# Patient Record
Sex: FEMALE | URBAN - METROPOLITAN AREA
[De-identification: names, ages, dates, MRNs, and addresses within clinical notes are randomized per-mention and may not be internally consistent; named-entity substitution may affect disease eponyms.]

---

## 2017-04-16 ENCOUNTER — TRANSFERRED RECORDS (OUTPATIENT)
Dept: HEALTH INFORMATION MANAGEMENT | Facility: CLINIC | Age: 32
End: 2017-04-16

## 2017-04-16 LAB
CREAT SERPL-MCNC: 0.95 MG/DL (ref 0.6–1.1)
GFR SERPL CREATININE-BSD FRML MDRD: >60 ML/MIN/1.73M2
GLUCOSE SERPL-MCNC: 86 MG/DL (ref 70–125)
POTASSIUM SERPL-SCNC: 3.9 MMOL/L (ref 3.5–5)

## 2017-08-28 DIAGNOSIS — I10 ESSENTIAL HYPERTENSION WITH GOAL BLOOD PRESSURE LESS THAN 140/90: ICD-10-CM

## 2017-08-28 NOTE — LETTER
[facility]  [address] [place] [address]    September 6, 2017    Caryn Delcid  [address]   [facility] [address]      Dear Caryn,    We have been unsuccessful reaching you by telephone. You are due for an office visit regarding your Blood Presser with me. You can schedule this one of a few ways. First using My Chart Scheduling, second is calling our Main number [phone] this line is open for scheduling 24 hours 7 days a week. Please let us know if you are getting care elsewhere.      Sincerely,        Aislinn Mohamud MD/dt

## 2017-08-29 RX ORDER — METHYLDOPA 500 MG/1
TABLET, FILM COATED ORAL
Qty: 60 TABLET | Refills: 0 | Status: SHIPPED | OUTPATIENT
Start: 2017-08-29 | End: 2017-10-27

## 2017-08-29 NOTE — TELEPHONE ENCOUNTER
methyldopa (ALDOMET) 500 MG tablet      Last Written Prescription Date:  8/2/2017  Last Fill Quantity: 60,   # refills: 0  Last [place] [facility] or [facility] prescribing provider: 7/12/2016  Future Office visit:       Routing refill request to provider for review/approval because:  Drug not on the [facility] or [facility] refill protocol or controlled substance

## 2017-10-27 ENCOUNTER — TELEPHONE (OUTPATIENT)
Dept: FAMILY MEDICINE | Facility: CLINIC | Age: 32
End: 2017-10-27

## 2017-10-27 DIAGNOSIS — I10 ESSENTIAL HYPERTENSION WITH GOAL BLOOD PRESSURE LESS THAN 140/90: ICD-10-CM

## 2017-10-31 RX ORDER — METHYLDOPA 500 MG/1
TABLET, FILM COATED ORAL
Qty: 60 TABLET | Refills: 0 | Status: SHIPPED | OUTPATIENT
Start: 2017-10-31 | End: 2017-11-29

## 2017-10-31 NOTE — TELEPHONE ENCOUNTER
Pending Prescriptions:                       Disp   Refills    methyldopa (ALDOMET) 500 MG tablet [Pharma*60 tab*0        Sig: TAKE 1 TABLET BY MOUTH TWICE DAILY    Medication not on FM protocol, routing to provider. Patient was advised after last refill request that a follow up appointment was needed for further refills, phone calls were attempted and a letter was sent.   Joselyn Cannon RN ............   10/31/2017...8:48 AM

## 2017-11-01 NOTE — TELEPHONE ENCOUNTER
Patient has appointment scheduled 11/7/2017 with provider.  Maite GONZALEZ CMA ([facility])

## 2017-11-29 ENCOUNTER — TELEPHONE (OUTPATIENT)
Dept: FAMILY MEDICINE | Facility: CLINIC | Age: 32
End: 2017-11-29

## 2017-11-29 DIAGNOSIS — I10 ESSENTIAL HYPERTENSION WITH GOAL BLOOD PRESSURE LESS THAN 140/90: ICD-10-CM

## 2017-12-01 RX ORDER — METHYLDOPA 500 MG/1
TABLET, FILM COATED ORAL
Qty: 30 TABLET | Refills: 0 | Status: SHIPPED | OUTPATIENT
Start: 2017-12-01

## 2017-12-07 NOTE — TELEPHONE ENCOUNTER
Called and left message to call clinic to make an appointment so we can refill her medication.  Imani Ruffin CMA ([facility])

## 2017-12-19 NOTE — TELEPHONE ENCOUNTER
Called pharmacy and informed them that we have been unsuccessful in trying to reach patient, they have put a message on patient's file that she needs to be seen for further refills.  Yandy De Leon MA

## 2018-06-19 ENCOUNTER — HEALTH MAINTENANCE LETTER (OUTPATIENT)
Age: 33
End: 2018-06-19

## 2020-03-02 ENCOUNTER — HEALTH MAINTENANCE LETTER (OUTPATIENT)
Age: 35
End: 2020-03-02

## 2020-12-20 ENCOUNTER — HEALTH MAINTENANCE LETTER (OUTPATIENT)
Age: 35
End: 2020-12-20

## 2021-04-24 ENCOUNTER — HEALTH MAINTENANCE LETTER (OUTPATIENT)
Age: 36
End: 2021-04-24

## 2021-10-03 ENCOUNTER — HEALTH MAINTENANCE LETTER (OUTPATIENT)
Age: 36
End: 2021-10-03

## 2022-05-15 ENCOUNTER — HEALTH MAINTENANCE LETTER (OUTPATIENT)
Age: 37
End: 2022-05-15

## 2022-09-10 ENCOUNTER — HEALTH MAINTENANCE LETTER (OUTPATIENT)
Age: 37
End: 2022-09-10